# Patient Record
Sex: FEMALE | Race: BLACK OR AFRICAN AMERICAN | Employment: PART TIME | URBAN - METROPOLITAN AREA
[De-identification: names, ages, dates, MRNs, and addresses within clinical notes are randomized per-mention and may not be internally consistent; named-entity substitution may affect disease eponyms.]

---

## 2021-04-07 ENCOUNTER — HOSPITAL ENCOUNTER (OUTPATIENT)
Dept: NUTRITION | Age: 36
Discharge: HOME | End: 2021-04-07
Payer: MEDICAID

## 2021-04-07 PROCEDURE — 97802 MEDICAL NUTRITION INDIV IN: CPT

## 2021-04-07 NOTE — PROGRESS NOTES
[address] [place] [address]  [address] [place], [address] [facility]  Phone: [phone] Fax: [phone]   Nutrition Assessment  Medical Nutrition Therapy   Outpatient Initial Evaluation         Patient Name: Lavern Clark : 1985   Treatment Diagnosis: Morbid Obesity due to excess calories, BMI 45-49.9   Referral Source: Kushal Maloney * Start of Care [facility]): 2021     Gender: female Age: 38 y.o. Ht: 62 in Wt:  253 lb  kg   BMI: 46.3 BMR   Male  BMR Female 1790     Past Medical History:  High Blood Pressure, smoker     Pertinent Medications:   Includes: Vitamin D     Biochemical Data:        Assessment:   Patient is present today for nutrition counseling in order to qualify for bariatric surgery in 6 months. Patient is required to lose 25 pounds (starting weight 255) in order to qualify for her surgery. Patient must meet with RD 6 times in 6 months. Patient lives at home with her 4 children and boyfriend. Patient works part-time 5 days per week. Patient is responsible for all grocery shopping and cooking at home. Patient reports that she walks about 1 mile everyday after work. Food & Nutrition: 24 Hour Recall:  Breakfast: [employer] chicken biscuit and hashbrowns  Lunch: skip  Dinner: Deloise  bowls with chicken and brown rice  Snacks: katherin chips with spinach and artichoke dip   Drinks: water (18 ounces per day), Gatorade (5 bottles per day), soda       Estimate Needs   Calories: 1750  Protein: 109 Carbs: 197 Fat: 58   Kcal/day  g/day  g/day  g/day        percent: 25  45  30               Nutrition Diagnosis   Obesity related to excessive carbohydrate intake as evidenced by 24 Hour Recall of carbohydrate dense foods ([employer] chicken biscuit, Gatorade, soda). Nutrition Intervention &  Education: Educated patient on the need for a consistent carbohydrate intake related to weight loss.  Educated patient on nutrition label reading, emphasizing carbohydrates and serving size. Educated patient on protein sources, encouraged patient to incorporate mostly lean protein source with all carbohydrates. Encouraged patient to exercise at least 150 minutes per week. Handouts Provided: [x]  Carbohydrates  [x]  Protein  [x]  Non-starchy Vegatbles  [x]  Food Label  [x]  Meal and Snack Ideas  []  Food Journals []  Diabetes  []  Cholesterol  []  Sodium  [x]  Gen Nutr Guidelines  []  SBGM Guidelines  []  Others:   Information Reviewed with: Patient    Readiness to Change Stage: []  Pre-contemplative    [x]  Contemplative  []  Preparation               []  Action                  []  Maintenance   Potential Barriers to Learning: []  Decline in memory    []  Language barrier   []  Other:  []  Emotional                  []  Limited mobility  [x]  Lack of motivation     [] Vision, hearing or cognitive impairment   Expected Compliance: Fair      Nutritional Goal - To promote lifestyle changes to result in:    [x]  Weight loss  []  Improved diabetic control  []  Decreased cholesterol levels  []  Decreased blood pressure  []  Weight maintenance []  Preventing any interactions associated with food allergies  []  Adequate weight gain toward goal weight  []  Other:        Patient Goals:   1. Patient will fill half of plate with non-starchy vegetables at lunch and dinner at least 5 days per week. 2. Patient will drink 64 ounces of water at least 5 days per week. 3. Patient will always include a protein at meals and snacks.      Dietitian Signature: Noemi Andrade RD Date: 4/7/2021   Follow-up: 5-12-21 @ 9am  Time: 10:34 AM

## 2021-05-17 ENCOUNTER — HOSPITAL ENCOUNTER (OUTPATIENT)
Dept: NUTRITION | Age: 36
Discharge: HOME | End: 2021-05-17
Payer: MEDICAID

## 2021-05-17 PROCEDURE — 97803 MED NUTRITION INDIV SUBSEQ: CPT

## 2021-05-17 NOTE — PROGRESS NOTES
NUTRITION  FOLLOW-UP TREATMENT NOTE  Patient Name: Lavern Clark         Date: 2021  : 1985    YES/NO Patient  Verified  Diagnosis: Morbid obesity due to excess calories, BMI 45-49.9   In time:   8:30am            Out time:  9:00am   Total Treatment Time (min):   30min     SUBJECTIVE/ASSESSMENT    Current Wt: 245# Previous Wt: 253# Wt Change: -8#     Initial Wt: 245# Total Wt change: -8# Height: 62in     Changes in medication or medical history? Any new allergies, surgeries or procedures? YES    If yes, update Summary List     Patient is not taking apple cider gummies (1 gummy daily). Nutrition Diagnosis        Diagnosis Status:   Obesity related to excessive carbohydrate intake as evidenced by 24 Hour Recall of carbohydrate dense foods (Mcd[employer]ds chicken biscuit, Gatorade, soda). []  Improved [x]  No Change    []  Declined        Nutrition Monitoring and Evaluation: Patient reports that she has been tracking calorie intake in a journal. Patient reports that she is still not consistent with eating pattern. Patient has made great progress with exercise- walking and taking classes at [facility]. Patient has found calorie free drink replacements. However, patient has not met goal of 64 ounces of water per day (improved since last visit). Nutrition Prescription and  Intervention Educated patient on the need for a consistent carbohydrate intake related to weight loss. Educated patient on nutrition label reading, emphasizing carbohydrates and serving size. Educated patient on protein sources, encouraged patient to incorporate mostly lean protein source with all carbohydrates. Encouraged patient to exercise at least 150 minutes per week.         Patient Education:  [x]  Review current plan with patient   []  Other:    Handouts/  Information Provided: []  Carbohydrates  []  Protein  []  Fiber  []  Serving Sizes  []  Fluids  []  General guidelines []  Diabetes  []  Cholesterol  [] Sodium  []  SBGM  []  Food Journals  []  Others:        Patient Goals 1. Patient will consume three meals per day 4-5 hours apart. 2. Patient will consume 64 ounces of water per day.       PLAN    []  Continue on current plan []  Follow-up PRN   []  Discharge due to :    [x]  Next appt:      Dietitian: Dennise Guevara RD    Date: 5/17/2021 Time: 2:55 PM

## 2021-06-16 ENCOUNTER — HOSPITAL ENCOUNTER (OUTPATIENT)
Dept: NUTRITION | Age: 36
Discharge: HOME | End: 2021-06-16
Payer: MEDICAID

## 2021-06-16 PROCEDURE — 97803 MED NUTRITION INDIV SUBSEQ: CPT

## 2021-06-16 NOTE — PROGRESS NOTES
NUTRITION  FOLLOW-UP TREATMENT NOTE  Patient Name: Yennifer Mejia         Date: 2021  : 1985    YES Patient  Verified  Diagnosis: Morbid obesity due to excess calories, BMI 45-49.9   In time:   8:30am            Out time:  9:00am   Total Treatment Time (min):   30min     SUBJECTIVE/ASSESSMENT    Current Wt: 246# Previous Wt: 245# Wt Change: +1#     Initial Wt: 253# Total Wt change: -7# Height: 62in     Changes in medication or medical history? Any new allergies, surgeries or procedures? NO    If yes, update Summary List                Nutrition Diagnosis        Diagnosis Status:   Obesity related to excessive carbohydrate intake as evidenced by 24 Hour Recall of carbohydrate dense foods (Mcd[employer]ds chicken biscuit, Gatorade, soda). []  Improved [x]  No Change    []  Declined        Nutrition Monitoring and Evaluation: Patient reports that for the last 2 weeks she was in [place] visiting her mother and helping her recover from recent surgery. Patient reports that she was cooking a lot for her mother and not tracking her food like normal. Patient was also not exercising like she normally does. Patient has been back home for 2 days and is not tracking all of her food and she had been to one class at the [facility]. Patient feels discouraged that she has not made as much progress as she would like. Nutrition Prescription and  Intervention Continued. ... Educated patient on the need for a consistent carbohydrate intake related to weight loss. Educated patient on nutrition label reading, emphasizing carbohydrates and serving size. Educated patient on protein sources, encouraged patient to incorporate mostly lean protein source with all carbohydrates. Encouraged patient to exercise at least 150 minutes per week.         Patient Education:  [x]  Review current plan with patient   []  Other:    Handouts/  Information Provided: []  Carbohydrates  []  Protein  []  Fiber  []  Serving Sizes  []  Fluids  [] General guidelines []  Diabetes  []  Cholesterol  []  Sodium  []  SBGM  []  Food Journals  []  Others:        Patient Goals 1. Patient will consume three meals per day 4-5 hours apart. 2. Patient will consume 64 ounces of water per day.       PLAN    []  Continue on current plan []  Follow-up PRN   []  Discharge due to :    [x]  Next appt: 7/14/21 @ 9am     Dietitian: Beatriz Shi RD    Date: 6/16/2021 Time: 2:55 PM

## 2021-07-14 ENCOUNTER — HOSPITAL ENCOUNTER (OUTPATIENT)
Dept: NUTRITION | Age: 36
Discharge: HOME | End: 2021-07-14
Payer: MEDICAID

## 2021-07-14 PROCEDURE — 97803 MED NUTRITION INDIV SUBSEQ: CPT

## 2021-07-14 NOTE — PROGRESS NOTES
NUTRITION  FOLLOW-UP TREATMENT NOTE  Patient Name: Jorge Marie         Date: 2021  : 1985    YES Patient  Verified  Diagnosis: Morbid obesity due to excess calories, BMI 45-49.9   In time:   9am            Out time:  9:30am   Total Treatment Time (min):   30min     SUBJECTIVE/ASSESSMENT    Current Wt: 244.4 Previous Wt: 246 Wt Change: -1.6#     Initial Wt: 253# Total Wt change: -8.6# Height: 62in     Changes in medication or medical history? Any new allergies, surgeries or procedures? NO    If yes, update Summary List                Nutrition Diagnosis        Diagnosis Status:   Obesity related to excessive carbohydrate intake as evidenced by 24 Hour Recall of carbohydrate dense foods (Mcd[employer]ds chicken biscuit, Gatorade, soda). []  Improved [x]  No Change    []  Declined        Nutrition Monitoring and Evaluation: Patient reports that she has been babysitting her 2 month old grandson. Due to needing to take care of grandson patient has not been walking and has been skipping more meals. Yesterday, patient only had a breakfast sandwich, tomatoes, and cucumbers the entire day. Patient reports that she did buy a water bottle and has been consistently consuming 64 ounces of water daily. Nutrition Prescription and  Intervention Continued. ... Educated patient on the need for a consistent carbohydrate intake related to weight loss. Educated patient on nutrition label reading, emphasizing carbohydrates and serving size. Educated patient on protein sources, encouraged patient to incorporate mostly lean protein source with all carbohydrates. Encouraged patient to exercise at least 150 minutes per week.         Patient Education:  [x]  Review current plan with patient   []  Other:    Handouts/  Information Provided: []  Carbohydrates  []  Protein  []  Fiber  []  Serving Sizes  []  Fluids  []  General guidelines []  Diabetes  []  Cholesterol  []  Sodium  []  SBGM  []  Food Journals  [] Others:        Patient Goals 1. Patient will consume three meals per day 4-5 hours apart. 2. Patient will consume 64 ounces of water per day.       PLAN    []  Continue on current plan []  Follow-up PRN   []  Discharge due to :    [x]  Next appt: 8-18-21 @ 9am      Dietitian: Drake Kim RD    Date: 7/14/2021 Time: 2:55 PM

## 2021-08-18 ENCOUNTER — HOSPITAL ENCOUNTER (OUTPATIENT)
Dept: NUTRITION | Age: 36
Discharge: HOME | End: 2021-08-18
Payer: MEDICAID

## 2021-08-18 PROCEDURE — 97803 MED NUTRITION INDIV SUBSEQ: CPT

## 2021-08-18 NOTE — PROGRESS NOTES
NUTRITION  FOLLOW-UP TREATMENT NOTE  Patient Name: Ellie Rinaldi         Date: 2021  : 1985    YES Patient  Verified  Diagnosis: Morbid obesity due to excess calories, BMI 45-49.9   In time:   9am            Out time:  9:30am   Total Treatment Time (min):   30min     SUBJECTIVE/ASSESSMENT    Current Wt: 242 Previous Wt: 244. 4 Wt Change: -2.4     Initial Wt: 253# Total Wt change: -11# Height: 62in     Changes in medication or medical history? Any new allergies, surgeries or procedures? NO    If yes, update Summary List                Nutrition Diagnosis        Diagnosis Status:   Obesity related to excessive carbohydrate intake as evidenced by 24 Hour Recall of carbohydrate dense foods (Mcd[employer]ds chicken biscuit, Gatorade, soda). []  Improved [x]  No Change    []  Declined        Nutrition Monitoring and Evaluation: Patient reports that she is still babysitting her grandson daily and is not able to walk as much as she would like. Patient reports that she is still staying very consistent with her meals. Patient reports that she continues to drink 64 ounces of water daily. Nutrition Prescription and  Intervention Continued. ... Educated patient on the need for a consistent carbohydrate intake related to weight loss. Educated patient on nutrition label reading, emphasizing carbohydrates and serving size. Educated patient on protein sources, encouraged patient to incorporate mostly lean protein source with all carbohydrates. Encouraged patient to exercise at least 150 minutes per week. Patient Education:  [x]  Review current plan with patient   []  Other:    Handouts/  Information Provided: []  Carbohydrates  []  Protein  []  Fiber  []  Serving Sizes  []  Fluids  []  General guidelines []  Diabetes  []  Cholesterol  []  Sodium  []  SBGM  []  Food Journals  []  Others:        Patient Goals 1. Patient will include a protein at all meals and snacks.       PLAN    []  Continue on current plan []  Follow-up PRN   []  Discharge due to :    [x]  Next appt: 9-15-21 @ 9am      Dietitian: Terell Lazcano RD    Date: 8/18/2021 Time: 2:55 PM

## 2021-09-15 ENCOUNTER — HOSPITAL ENCOUNTER (OUTPATIENT)
Dept: NUTRITION | Age: 36
Discharge: HOME | End: 2021-09-15
Payer: MEDICAID

## 2021-09-15 PROCEDURE — 97803 MED NUTRITION INDIV SUBSEQ: CPT

## 2021-09-15 NOTE — PROGRESS NOTES
NUTRITION  FOLLOW-UP TREATMENT NOTE  Patient Name: Anahy Maynor         Date: 9/15/2021  : 1985    YES Patient  Verified  Diagnosis: Morbid obesity due to excess calories, BMI 45-49.9   In time:   11am            Out time:  11:30am    Total Treatment Time (min):   30min     SUBJECTIVE/ASSESSMENT    Current Wt: 242# Previous Wt: 242# Wt Change: 0     Initial Wt: 253# Total Wt change: -11# Height: 62in     Changes in medication or medical history? Any new allergies, surgeries or procedures? NO    If yes, update Summary List                Nutrition Diagnosis        Diagnosis Status:   Obesity related to excessive carbohydrate intake as evidenced by 24 Hour Recall of carbohydrate dense foods (Mcd[employer]ds chicken biscuit, Gatorade, soda). []  Improved [x]  No Change    []  Declined        Nutrition Monitoring and Evaluation: Patient reports that she has been able to exercise more during the week, has been walking about 4 days per week for an hour. Patient has recently started consuming sugary beverages, lemonade at lunch yesterday and drinking 2 bottles of apple juice daily. Nutrition Prescription and  Intervention Continued. ... Educated patient on the need for a consistent carbohydrate intake related to weight loss. Educated patient on nutrition label reading, emphasizing carbohydrates and serving size. Educated patient on protein sources, encouraged patient to incorporate mostly lean protein source with all carbohydrates. Encouraged patient to exercise at least 150 minutes per week. Patient Education:  [x]  Review current plan with patient   []  Other:    Handouts/  Information Provided: []  Carbohydrates  []  Protein  []  Fiber  []  Serving Sizes  []  Fluids  []  General guidelines []  Diabetes  []  Cholesterol  []  Sodium  []  SBGM  []  Food Journals  []  Others:        Patient Goals 1. Patient will limit sugary drinks to 2-3 small glasses per week.    2. Patient will include a protein at all meals and snacks.       PLAN    []  Continue on current plan [x]  Follow-up PRN   []  Discharge due to :    []  Next appt:      Dietitian: Sree Garcia RD    Date: 9/15/2021 Time: 2:55 PM